# Patient Record
Sex: MALE | Employment: OTHER | URBAN - METROPOLITAN AREA
[De-identification: names, ages, dates, MRNs, and addresses within clinical notes are randomized per-mention and may not be internally consistent; named-entity substitution may affect disease eponyms.]

---

## 2020-01-01 ENCOUNTER — HOSPITAL ENCOUNTER (EMERGENCY)
Age: 85
End: 2020-07-01
Attending: EMERGENCY MEDICINE | Admitting: EMERGENCY MEDICINE
Payer: MEDICARE

## 2020-01-01 DIAGNOSIS — I46.9 CARDIAC ARREST (HCC): Primary | ICD-10-CM

## 2020-01-01 PROCEDURE — 99284 EMERGENCY DEPT VISIT MOD MDM: CPT

## 2020-07-01 NOTE — ED TRIAGE NOTES
Patient arrived via EMS receiving chest compressions and bag via bag valve mask. Upon checking for breath sounds and pulse no pulse felt, no breath sounds. Dr Dago Degroot pronounced time of death at [id number]

## 2020-07-01 NOTE — ED NOTES
Spoke with daughter who states she doesn't know what to do because she is in [place] and they didn't know he was going to die so they never had that conversation and due to her being 3 hours away she doesn't know what to do. I have spoken with Kanchan Portillo with pastoral care and asked for her help and gave her the number and name of Geraldine Jean Paul [phone]

## 2020-07-01 NOTE — ED NOTES
Spoke with Courtney Sharpe, nurse at [facility] and informed that daughter might call for more information and to inform him that patient had passed.

## 2020-07-01 NOTE — PROGRESS NOTES
Spiritual Care Assessment/Progress Note [address] 
 
 
NAME: Debby Valdez      MRN: [id number] AGE: 81 y.o. SEX: male Restoration Affiliation:   
Language:   
 
7/1/2020     Total Time (in minutes): 23 Spiritual Assessment [facility] [address] [facility] through conversation with: 
  
    []Patient        [x] Family    [] Friend(s) Reason for Consult: Death, ER Spiritual beliefs: (Please include comment if needed) [x] Identifies with a erma tradition:  Jew   
   [] Supported by a erma community:        
   [] Claims no spiritual orientation:       
   [] Seeking spiritual identity:            
   [] Adheres to an individual form of spirituality:       
   [] Not able to assess:                   
 
    
Identified resources for coping:  
   [] Prayer                           
   [] Music                  [] Guided Imagery 
   [] Family/friends                 [] Pet visits [] Devotional reading                         [x] Unknown 
   [] Other:                                          
 
 
Interventions offered during this visit: (See comments for more details) Family/Friend(s): Affirmation of emotions/emotional suffering, Catharsis/review of pertinent events in supportive environment, Life review/legacy Plan of Care: 
 
 [] Support spiritual and/or cultural needs  
 [] Support AMD and/or advance care planning process [x] Support grieving process 
 [] Coordinate Rites and/or Rituals  
 [] Coordination with community clergy [] No spiritual needs identified at this time 
 [] Detailed Plan of Care below (See Comments)  [] Make referral to Music Therapy 
[] Make referral to Pet Therapy    
[] Make referral to Addiction services 
[] Make referral to [facility] 
[] Make referral to Spiritual Care Partner 
[] No future visits requested       
[x] Follow up visits as needed Comments:  responded to the notification of death of Mr. Jazmin Lopez in the ER. Collaborated with Mr. Zayas's nurse who informed  that family could use some additional support as his death was very unexpected for them. They live in [facility] and are deciding whether or not to come to the hospital. Per nurses request,  contacted Mr. Lopez Benites daughter, Pabol Stringer, introduced herself and offered her condolences. Pablo Stringer was very tearful on the phone and shared her feelings of disbelief, shock, and grief.  continued to be a supportive presence as Pablo Stringer shared other feelings and concerns and also processed through decision making and next steps. Per Pablo Stringer, her father is Maite Ashraf provided silent support outside of Mr. Zayas's room. Pablo Stringer requested to speak with his nurse and  provided assistance. 's will follow up as able and/or needed [employer]. Jimi Colón.  Paging Service: [phone])

## 2020-07-01 NOTE — ED PROVIDER NOTES
HPI .  History is mainly from the physician from [facility] who transferred the patient and EMS. 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 He was admitted to a [facility] the last day of May. He was transferred to [facility] yesterday late. He reportedly had COVID testing 4 times and each time the test was negative. MRI of the brain showed only sinusitis and he apparently is on amoxicillin. He is on Coumadin and Lovenox for a DVT of the leg. He has a tracheostomy and a PEG tube in the left arm midline line. Physician at [facility] noted this morning that the patient was tachypneic and bradycardic. He was given 2 doses of IV atropine and then started on levophed drip. About this time the [facility] called me. Subsequently the patient apparently became pulseless and was given an amp of epinephrine and chest compressions were started. Patient was given amiodarone. Apparently there was return of spontaneous circulation briefly. EMS came to the scene and patient became pulseless again about the time they got him back in the ambulance. They gave the patient another amp of epinephrine. No past medical history on file. No past surgical history on file. No family history on file. Social History Socioeconomic History  Marital status: Not on file Spouse name: Not on file  Number of children: Not on file  Years of education: Not on file  Highest education level: Not on file Occupational History  Not on file Social Needs  Financial resource strain: Not on file  Food insecurity Worry: Not on file Inability: Not on file  Transportation needs Medical: Not on file Non-medical: Not on file Tobacco Use  Smoking status: Not on file Substance and Sexual Activity  Alcohol use: Not on file  Drug use: Not on file  Sexual activity: Not on file Lifestyle  Physical activity Days per week: Not on file Minutes per session: Not on file  Stress: Not on file Relationships  Social connections Talks on phone: Not on file Gets together: Not on file Attends Taoism service: Not on file Active member of club or organization: Not on file Attends meetings of clubs or organizations: Not on file Relationship status: Not on file  Intimate partner violence Fear of current or ex partner: Not on file Emotionally abused: Not on file Physically abused: Not on file Forced sexual activity: Not on file Other Topics Concern  Not on file Social History Narrative  Not on file ALLERGIES: Patient has no allergy information on record. Review of Systems Reason unable to perform ROS: Nonverbal.  
 
 
There were no vitals filed for this visit. Physical Exam 
Vitals signs and nursing note reviewed. Constitutional:   
   Appearance: He is well-developed. Comments: Unconscious HENT:  
   Head: Normocephalic and atraumatic. Eyes:  
   Pupils: Pupils are equal, round, and reactive to light. Neck: Musculoskeletal: Normal range of motion and neck supple. Cardiovascular:  
   Heart sounds: No murmur. No friction rub. No gallop. Comments: Midline line; left upper extremity; no heart tones; right inguinal area line Pulmonary:  
   Effort: No respiratory distress. Breath sounds: No wheezing or rales. Comments: No respiratory effort; equal breath sounds with bagging; tracheostomy Abdominal:  
   Palpations: Abdomen is soft. Tenderness: There is no abdominal tenderness. There is no rebound. Genitourinary: 
   Comments: Rectal tube; Lilly catheter Musculoskeletal: Normal range of motion. General: No tenderness. Skin: 
   Findings: No erythema. Neurological:  
   Cranial Nerves: No cranial nerve deficit. Comments: Motor; symmetric MDM Procedures Note: [facility] other problems including history of acute and chronic respiratory failure secondary to interstitial lung disease and amiodarone toxicity.   Suspicion for aspiration pneumonia; debility and malnutrition; seizures; hypertension; hyperlipidemia;  Baljinder Hill MD 
9:10 AM

## 2020-07-01 NOTE — ED NOTES
Called daughter and gave her the number for nursing supervisor, I explained that his wedding band is still on as I couldn't get it off. She is still confused as to what to do from here and I told her to just find a  home and they will walk her through it. She states he had an overnight bag that she sent with him to [place]. I informed her that bag didn't come here

## 2020-07-01 NOTE — ED NOTES
Spoke with the daughter and she wanted more information as to what exactly happened. I explained that I cannot say what exactly happened because I can only say what happened here. I explained to her if she wants more information on what happened last night I would call Rodolfo Montez for more information